# Patient Record
Sex: MALE | Race: WHITE | Employment: UNEMPLOYED | URBAN - METROPOLITAN AREA
[De-identification: names, ages, dates, MRNs, and addresses within clinical notes are randomized per-mention and may not be internally consistent; named-entity substitution may affect disease eponyms.]

---

## 2017-01-26 DIAGNOSIS — K21.9 GASTROESOPHAGEAL REFLUX DISEASE, ESOPHAGITIS PRESENCE NOT SPECIFIED: ICD-10-CM

## 2017-01-27 RX ORDER — RANITIDINE 150 MG/1
150 TABLET ORAL 2 TIMES DAILY
Qty: 60 TABLET | Refills: 3 | OUTPATIENT
Start: 2017-01-27

## 2017-01-27 RX ORDER — PANTOPRAZOLE SODIUM 40 MG/1
40 TABLET, DELAYED RELEASE ORAL DAILY
Qty: 30 TABLET | Refills: 3 | Status: SHIPPED | OUTPATIENT
Start: 2017-01-27 | End: 2018-06-11

## 2017-06-08 ENCOUNTER — OFFICE VISIT (OUTPATIENT)
Dept: INTERNAL MEDICINE CLINIC | Age: 22
End: 2017-06-08

## 2017-06-08 VITALS
HEART RATE: 117 BPM | DIASTOLIC BLOOD PRESSURE: 84 MMHG | WEIGHT: 210.6 LBS | SYSTOLIC BLOOD PRESSURE: 108 MMHG | BODY MASS INDEX: 27.79 KG/M2

## 2017-06-08 DIAGNOSIS — F84.9: ICD-10-CM

## 2017-06-08 DIAGNOSIS — R00.0 TACHYCARDIA: ICD-10-CM

## 2017-06-08 DIAGNOSIS — Z91.09 ENVIRONMENTAL ALLERGIES: ICD-10-CM

## 2017-06-08 DIAGNOSIS — Z00.00 ANNUAL PHYSICAL EXAM: ICD-10-CM

## 2017-06-08 DIAGNOSIS — F84.0 AUTISM SPECTRUM DISORDER: ICD-10-CM

## 2017-06-08 DIAGNOSIS — E78.2 MIXED HYPERLIPIDEMIA: ICD-10-CM

## 2017-06-08 DIAGNOSIS — K21.9 GASTROESOPHAGEAL REFLUX DISEASE, ESOPHAGITIS PRESENCE NOT SPECIFIED: ICD-10-CM

## 2017-06-08 DIAGNOSIS — F33.9 MAJOR DEPRESSIVE DISORDER, RECURRENT EPISODE WITH ANXIOUS DISTRESS (HCC): Primary | ICD-10-CM

## 2017-06-08 DIAGNOSIS — E66.3 OVERWEIGHT (BMI 25.0-29.9): ICD-10-CM

## 2017-06-08 PROCEDURE — G8427 DOCREV CUR MEDS BY ELIG CLIN: HCPCS | Performed by: INTERNAL MEDICINE

## 2017-06-08 PROCEDURE — 1036F TOBACCO NON-USER: CPT | Performed by: INTERNAL MEDICINE

## 2017-06-08 PROCEDURE — G8419 CALC BMI OUT NRM PARAM NOF/U: HCPCS | Performed by: INTERNAL MEDICINE

## 2017-06-08 PROCEDURE — 99213 OFFICE O/P EST LOW 20 MIN: CPT | Performed by: INTERNAL MEDICINE

## 2017-06-08 RX ORDER — CETIRIZINE HYDROCHLORIDE 10 MG/1
10 TABLET ORAL DAILY
Qty: 30 TABLET | Refills: 1 | Status: CANCELLED | OUTPATIENT
Start: 2017-06-08

## 2017-06-08 ASSESSMENT — ENCOUNTER SYMPTOMS
SHORTNESS OF BREATH: 0
EYE PAIN: 0
ABDOMINAL PAIN: 0
DIARRHEA: 0
COUGH: 0
CONSTIPATION: 0
BACK PAIN: 0
SORE THROAT: 0
WHEEZING: 0

## 2017-06-27 ENCOUNTER — HOSPITAL ENCOUNTER (OUTPATIENT)
Dept: GENERAL RADIOLOGY | Age: 22
Discharge: HOME | End: 2017-06-27
Attending: INTERNAL MEDICINE | Admitting: INTERNAL MEDICINE

## 2017-06-27 LAB
ALBUMIN SERPL-MCNC: 4.6 GM/DL (ref 3.4–5)
ALP BLD-CCNC: 103 IU/L (ref 40–128)
ALT SERPL-CCNC: 44 U/L (ref 10–40)
ANION GAP SERPL CALCULATED.3IONS-SCNC: 16 MMOL/L (ref 4–16)
AST SERPL-CCNC: 20 IU/L (ref 15–37)
BASOPHILS ABSOLUTE: 0 K/CU MM
BASOPHILS RELATIVE PERCENT: 0 % (ref 0–1)
BILIRUB SERPL-MCNC: 0.4 MG/DL (ref 0–1)
BUN BLDV-MCNC: 11 MG/DL (ref 6–23)
CALCIUM SERPL-MCNC: 9.8 MG/DL (ref 8.3–10.6)
CHLORIDE BLD-SCNC: 101 MMOL/L (ref 99–110)
CHOLESTEROL: 171 MG/DL
CO2: 26 MMOL/L (ref 21–32)
CREAT SERPL-MCNC: 0.7 MG/DL (ref 0.9–1.3)
DIFFERENTIAL TYPE: ABNORMAL
EOSINOPHILS ABSOLUTE: 0 K/CU MM
EOSINOPHILS RELATIVE PERCENT: 0 % (ref 0–3)
GFR AFRICAN AMERICAN: >60 ML/MIN/1.73M2
GFR NON-AFRICAN AMERICAN: >60 ML/MIN/1.73M2
GLUCOSE FASTING: 79 MG/DL (ref 70–99)
HCT VFR BLD CALC: 46.9 % (ref 42–52)
HDLC SERPL-MCNC: 42 MG/DL
HEMOGLOBIN: 15.8 GM/DL (ref 13.5–18)
IMMATURE NEUTROPHIL %: 0.3 % (ref 0–0.43)
LDL CHOLESTEROL CALCULATED: 115 MG/DL
LYMPHOCYTES ABSOLUTE: 1.6 K/CU MM
LYMPHOCYTES RELATIVE PERCENT: 27 % (ref 24–44)
MCH RBC QN AUTO: 29.4 PG (ref 27–31)
MCHC RBC AUTO-ENTMCNC: 33.7 % (ref 32–36)
MCV RBC AUTO: 87.2 FL (ref 78–100)
MONOCYTES ABSOLUTE: 0.3 K/CU MM
MONOCYTES RELATIVE PERCENT: 5.7 % (ref 0–4)
NUCLEATED RBC %: 0 %
PDW BLD-RTO: 13.1 % (ref 11.7–14.9)
PLATELET # BLD: 258 K/CU MM (ref 140–440)
PMV BLD AUTO: 10.2 FL (ref 7.5–11.1)
POTASSIUM SERPL-SCNC: 4.3 MMOL/L (ref 3.5–5.1)
RBC # BLD: 5.38 M/CU MM (ref 4.6–6.2)
SEGMENTED NEUTROPHILS ABSOLUTE COUNT: 4 K/CU MM
SEGMENTED NEUTROPHILS RELATIVE PERCENT: 67 % (ref 36–66)
SODIUM BLD-SCNC: 143 MMOL/L (ref 135–145)
TOTAL IMMATURE NEUTOROPHIL: 0.02 K/CU MM
TOTAL NUCLEATED RBC: 0 K/CU MM
TOTAL PROTEIN: 7.2 GM/DL (ref 6.4–8.2)
TRIGL SERPL-MCNC: 69 MG/DL
WBC # BLD: 6 K/CU MM (ref 4–10.5)

## 2017-12-07 ENCOUNTER — TELEPHONE (OUTPATIENT)
Dept: INTERNAL MEDICINE CLINIC | Age: 22
End: 2017-12-07

## 2017-12-08 ENCOUNTER — OFFICE VISIT (OUTPATIENT)
Dept: INTERNAL MEDICINE CLINIC | Age: 22
End: 2017-12-08

## 2017-12-08 VITALS
OXYGEN SATURATION: 97 % | RESPIRATION RATE: 16 BRPM | WEIGHT: 211 LBS | BODY MASS INDEX: 27.84 KG/M2 | HEART RATE: 89 BPM | SYSTOLIC BLOOD PRESSURE: 118 MMHG | DIASTOLIC BLOOD PRESSURE: 72 MMHG

## 2017-12-08 DIAGNOSIS — F84.0 AUTISM SPECTRUM DISORDER: ICD-10-CM

## 2017-12-08 DIAGNOSIS — F33.9 MAJOR DEPRESSIVE DISORDER, RECURRENT EPISODE WITH ANXIOUS DISTRESS (HCC): Primary | ICD-10-CM

## 2017-12-08 DIAGNOSIS — R74.01 TRANSAMINITIS: ICD-10-CM

## 2017-12-08 DIAGNOSIS — K21.9 GASTROESOPHAGEAL REFLUX DISEASE, ESOPHAGITIS PRESENCE NOT SPECIFIED: ICD-10-CM

## 2017-12-08 DIAGNOSIS — Z13.220 LIPID SCREENING: ICD-10-CM

## 2017-12-08 DIAGNOSIS — F84.5 ASPERGER'S DISORDER: ICD-10-CM

## 2017-12-08 DIAGNOSIS — Z23 NEED FOR INFLUENZA VACCINATION: ICD-10-CM

## 2017-12-08 DIAGNOSIS — E66.3 OVERWEIGHT (BMI 25.0-29.9): ICD-10-CM

## 2017-12-08 PROCEDURE — G8427 DOCREV CUR MEDS BY ELIG CLIN: HCPCS | Performed by: INTERNAL MEDICINE

## 2017-12-08 PROCEDURE — 99213 OFFICE O/P EST LOW 20 MIN: CPT | Performed by: INTERNAL MEDICINE

## 2017-12-08 PROCEDURE — G8484 FLU IMMUNIZE NO ADMIN: HCPCS | Performed by: INTERNAL MEDICINE

## 2017-12-08 PROCEDURE — 90686 IIV4 VACC NO PRSV 0.5 ML IM: CPT | Performed by: INTERNAL MEDICINE

## 2017-12-08 PROCEDURE — G8419 CALC BMI OUT NRM PARAM NOF/U: HCPCS | Performed by: INTERNAL MEDICINE

## 2017-12-08 PROCEDURE — 1036F TOBACCO NON-USER: CPT | Performed by: INTERNAL MEDICINE

## 2017-12-08 PROCEDURE — 90471 IMMUNIZATION ADMIN: CPT | Performed by: INTERNAL MEDICINE

## 2017-12-08 ASSESSMENT — ENCOUNTER SYMPTOMS
SHORTNESS OF BREATH: 0
CONSTIPATION: 0
WHEEZING: 0
COUGH: 0
BACK PAIN: 0
SORE THROAT: 0
DIARRHEA: 0
ABDOMINAL PAIN: 0
EYE PAIN: 0

## 2018-06-11 ENCOUNTER — OFFICE VISIT (OUTPATIENT)
Dept: INTERNAL MEDICINE CLINIC | Age: 23
End: 2018-06-11

## 2018-06-11 VITALS
HEIGHT: 72 IN | SYSTOLIC BLOOD PRESSURE: 118 MMHG | RESPIRATION RATE: 14 BRPM | HEART RATE: 112 BPM | BODY MASS INDEX: 28.17 KG/M2 | WEIGHT: 208 LBS | DIASTOLIC BLOOD PRESSURE: 60 MMHG | OXYGEN SATURATION: 98 %

## 2018-06-11 DIAGNOSIS — F33.9 MAJOR DEPRESSIVE DISORDER, RECURRENT EPISODE WITH ANXIOUS DISTRESS (HCC): Primary | ICD-10-CM

## 2018-06-11 DIAGNOSIS — E78.2 MIXED HYPERLIPIDEMIA: ICD-10-CM

## 2018-06-11 PROCEDURE — 1036F TOBACCO NON-USER: CPT | Performed by: FAMILY MEDICINE

## 2018-06-11 PROCEDURE — 99202 OFFICE O/P NEW SF 15 MIN: CPT | Performed by: FAMILY MEDICINE

## 2018-06-11 PROCEDURE — G8427 DOCREV CUR MEDS BY ELIG CLIN: HCPCS | Performed by: FAMILY MEDICINE

## 2018-06-11 PROCEDURE — G8419 CALC BMI OUT NRM PARAM NOF/U: HCPCS | Performed by: FAMILY MEDICINE

## 2018-06-11 RX ORDER — BUSPIRONE HYDROCHLORIDE 10 MG/1
TABLET ORAL
Status: CANCELLED | OUTPATIENT
Start: 2018-06-11

## 2018-06-11 RX ORDER — VENLAFAXINE HYDROCHLORIDE 150 MG/1
150 CAPSULE, EXTENDED RELEASE ORAL DAILY
Qty: 30 CAPSULE | Status: CANCELLED | OUTPATIENT
Start: 2018-06-11

## 2018-06-11 ASSESSMENT — ENCOUNTER SYMPTOMS
BLOOD IN STOOL: 0
EYE DISCHARGE: 0
SHORTNESS OF BREATH: 0
NAUSEA: 0
DIARRHEA: 0
SINUS PRESSURE: 0
COUGH: 0
VOMITING: 0
BACK PAIN: 0
SORE THROAT: 0

## 2018-06-16 LAB
CHOLESTEROL, TOTAL: 208 MG/DL
CHOLESTEROL/HDL RATIO: ABNORMAL
HDLC SERPL-MCNC: 45 MG/DL (ref 35–70)
LDL CHOLESTEROL CALCULATED: 146 MG/DL (ref 0–160)
TRIGL SERPL-MCNC: 85 MG/DL
VLDLC SERPL CALC-MCNC: 17 MG/DL

## 2018-06-26 ENCOUNTER — TELEPHONE (OUTPATIENT)
Dept: INTERNAL MEDICINE CLINIC | Age: 23
End: 2018-06-26

## 2018-06-26 DIAGNOSIS — E78.2 MIXED HYPERLIPIDEMIA: ICD-10-CM

## 2018-06-28 ENCOUNTER — OFFICE VISIT (OUTPATIENT)
Dept: PSYCHOLOGY | Age: 23
End: 2018-06-28

## 2018-06-28 DIAGNOSIS — F84.0 AUTISM SPECTRUM DISORDER: ICD-10-CM

## 2018-06-28 DIAGNOSIS — F32.A DEPRESSIVE DISORDER: Primary | ICD-10-CM

## 2018-06-28 PROCEDURE — 90791 PSYCH DIAGNOSTIC EVALUATION: CPT | Performed by: PSYCHOLOGIST

## 2018-06-28 ASSESSMENT — PATIENT HEALTH QUESTIONNAIRE - PHQ9
2. FEELING DOWN, DEPRESSED OR HOPELESS: 1
6. FEELING BAD ABOUT YOURSELF - OR THAT YOU ARE A FAILURE OR HAVE LET YOURSELF OR YOUR FAMILY DOWN: 1
9. THOUGHTS THAT YOU WOULD BE BETTER OFF DEAD, OR OF HURTING YOURSELF: 0
7. TROUBLE CONCENTRATING ON THINGS, SUCH AS READING THE NEWSPAPER OR WATCHING TELEVISION: 0
10. IF YOU CHECKED OFF ANY PROBLEMS, HOW DIFFICULT HAVE THESE PROBLEMS MADE IT FOR YOU TO DO YOUR WORK, TAKE CARE OF THINGS AT HOME, OR GET ALONG WITH OTHER PEOPLE: 1
3. TROUBLE FALLING OR STAYING ASLEEP: 2
1. LITTLE INTEREST OR PLEASURE IN DOING THINGS: 1
5. POOR APPETITE OR OVEREATING: 0
SUM OF ALL RESPONSES TO PHQ QUESTIONS 1-9: 6
8. MOVING OR SPEAKING SO SLOWLY THAT OTHER PEOPLE COULD HAVE NOTICED. OR THE OPPOSITE, BEING SO FIGETY OR RESTLESS THAT YOU HAVE BEEN MOVING AROUND A LOT MORE THAN USUAL: 0
4. FEELING TIRED OR HAVING LITTLE ENERGY: 1
SUM OF ALL RESPONSES TO PHQ9 QUESTIONS 1 & 2: 2

## 2018-06-29 ENCOUNTER — TELEPHONE (OUTPATIENT)
Dept: INTERNAL MEDICINE CLINIC | Age: 23
End: 2018-06-29

## 2018-07-03 ENCOUNTER — TELEPHONE (OUTPATIENT)
Dept: INTERNAL MEDICINE CLINIC | Age: 23
End: 2018-07-03

## 2018-07-12 ENCOUNTER — OFFICE VISIT (OUTPATIENT)
Dept: PSYCHOLOGY | Age: 23
End: 2018-07-12

## 2018-07-12 DIAGNOSIS — F84.0 AUTISM SPECTRUM DISORDER: ICD-10-CM

## 2018-07-12 DIAGNOSIS — F32.A DEPRESSIVE DISORDER: Primary | ICD-10-CM

## 2018-07-12 PROCEDURE — 90832 PSYTX W PT 30 MINUTES: CPT | Performed by: PSYCHOLOGIST

## 2018-07-12 ASSESSMENT — PATIENT HEALTH QUESTIONNAIRE - PHQ9
4. FEELING TIRED OR HAVING LITTLE ENERGY: 1
8. MOVING OR SPEAKING SO SLOWLY THAT OTHER PEOPLE COULD HAVE NOTICED. OR THE OPPOSITE, BEING SO FIGETY OR RESTLESS THAT YOU HAVE BEEN MOVING AROUND A LOT MORE THAN USUAL: 0
6. FEELING BAD ABOUT YOURSELF - OR THAT YOU ARE A FAILURE OR HAVE LET YOURSELF OR YOUR FAMILY DOWN: 0
9. THOUGHTS THAT YOU WOULD BE BETTER OFF DEAD, OR OF HURTING YOURSELF: 0
3. TROUBLE FALLING OR STAYING ASLEEP: 2
1. LITTLE INTEREST OR PLEASURE IN DOING THINGS: 1
SUM OF ALL RESPONSES TO PHQ9 QUESTIONS 1 & 2: 2
7. TROUBLE CONCENTRATING ON THINGS, SUCH AS READING THE NEWSPAPER OR WATCHING TELEVISION: 0
SUM OF ALL RESPONSES TO PHQ QUESTIONS 1-9: 5
2. FEELING DOWN, DEPRESSED OR HOPELESS: 1
5. POOR APPETITE OR OVEREATING: 0

## 2018-07-12 NOTE — PROGRESS NOTES
Behavioral Health Consultation  Laine Gaspar Psy.D. Psychologist      Time spent with Patient: 30 minutes  Visit number: 2  Reason for Consult:  depression and autism  Referring Provider: Mikey Dooley MD  [address], [place] [address]    S:  ----------------------------------------------------------------------------------------------------------------------  \"Things have been okay I guess. \" Has been enjoying playing video games and talking with friends online. Spent much of developing rapport by discussing pt's interests in video games, writing, history. Pt is not connected with [facility]  office, but is open to referral.     O:  ----------------------------------------------------------------------------------------------------------------------  MSE:  Orientation:  oriented to person, place, time, and general circumstances  Appearance and behavior:  alert, cooperative  Speech:  spontaneous, normal rate and normal volume  Mood: depressed   Thought Content:  intact, hopelessness and helplessness  Thought Process:  linear, goal directed and coherent  Interest/Pleasure: Loss of Pleasure/Fun  Sleep disturbance: Yes  Motivation: Moderate  Energy: Tired/Fatigued  Morbid ideation No  Suicide Assessment: no suicidal ideation    A:  ----------------------------------------------------------------------------------------------------------------------  Diagnosis:    1. Depressive disorder    2.  Autism spectrum disorder         PHQ Scores 7/12/2018 6/28/2018 6/8/2016   PHQ2 Score 2 2 0   PHQ9 Score 5 6 0     Interpretation of Total Score Depression Severity: 1-4 = Minimal depression, 5-9 = Mild depression, 10-14 = Moderate depression, 15-19 = Moderately severe depression, 20-27 = Severe depression    P:  ----------------------------------------------------------------------------------------------------------------------  Pt will need full neurodevelopmental assessment in order to be linked with local dept of DD. Pt to speak w guardian and attempt to obtain records. If unable will be referred for testing. Pt interventions:    General:   [x] [place]-setting to identify pt's primary goals for [facility] visit / overall health   [x] Provided psychoeducation/handout on:   1. Depressive disorder    2. Autism spectrum disorder        [x]  Supportive interventions    Cognitive:   [x] Trained in strategies for increasing balanced thinking   [x] Cognitive strategies to target current mental health sx    [x] Identified and challenged maladaptive thoughts    Behavioral:   [x] Discussed and set plan for behavioral activation   [x] Discussed and problem-solved barriers in adhering to behavioral change plan   [x] Motivational Interviewing to increase patient confidence and compliance with       adhering to behavioral change plan   [x] Discussed potential barriers to change   [x] Discussed self-care (sleep, nutrition, rewarding activities, social support, exercise)    Other:   []   []   []   []    Recommendations to patient:    1. Return to Dr. Que Andrews in 2-4 week(s)    2.                Feedback provided to pt's PCP via [employer] and/or oral report

## 2018-08-02 ENCOUNTER — OFFICE VISIT (OUTPATIENT)
Dept: PSYCHOLOGY | Age: 23
End: 2018-08-02

## 2018-08-02 DIAGNOSIS — F32.A DEPRESSIVE DISORDER: Primary | ICD-10-CM

## 2018-08-02 DIAGNOSIS — F84.0 AUTISM SPECTRUM DISORDER: ICD-10-CM

## 2018-08-02 PROCEDURE — 90832 PSYTX W PT 30 MINUTES: CPT | Performed by: PSYCHOLOGIST

## 2018-08-02 ASSESSMENT — PATIENT HEALTH QUESTIONNAIRE - PHQ9
8. MOVING OR SPEAKING SO SLOWLY THAT OTHER PEOPLE COULD HAVE NOTICED. OR THE OPPOSITE, BEING SO FIGETY OR RESTLESS THAT YOU HAVE BEEN MOVING AROUND A LOT MORE THAN USUAL: 0
SUM OF ALL RESPONSES TO PHQ QUESTIONS 1-9: 6
9. THOUGHTS THAT YOU WOULD BE BETTER OFF DEAD, OR OF HURTING YOURSELF: 0
SUM OF ALL RESPONSES TO PHQ9 QUESTIONS 1 & 2: 2
6. FEELING BAD ABOUT YOURSELF - OR THAT YOU ARE A FAILURE OR HAVE LET YOURSELF OR YOUR FAMILY DOWN: 0
4. FEELING TIRED OR HAVING LITTLE ENERGY: 1
5. POOR APPETITE OR OVEREATING: 0
2. FEELING DOWN, DEPRESSED OR HOPELESS: 1
7. TROUBLE CONCENTRATING ON THINGS, SUCH AS READING THE NEWSPAPER OR WATCHING TELEVISION: 1
1. LITTLE INTEREST OR PLEASURE IN DOING THINGS: 1
3. TROUBLE FALLING OR STAYING ASLEEP: 2

## 2018-08-02 NOTE — PROGRESS NOTES
Behavioral Health Consultation  Laine Felder Psy.D. Psychologist    Time spent with Patient: 30 minutes  Visit number: 3  Reason for Consult:  depression and autism  Referring Provider: Suzi Gonzalez MD  [address] [place] [address]    S:  ----------------------------------------------------------------------------------------------------------------------  Mood remains depressed. Did not speak with guardian about full psych reports/testing. Remains interested in being linked with local [facility] of . [place] that his step-father's boss has a daughter who is also autistic. Pasqual Essex is excited about the opportunity to meet someone else who is autistic. Received verbal authorization to send referral for testing to [address]:  ----------------------------------------------------------------------------------------------------------------------  MSE:  Orientation:  oriented to person, place, time, and general circumstances  Appearance and behavior:  alert, cooperative  Speech:  spontaneous, normal rate and normal volume  Mood: depressed   Thought Content:  intact, hopelessness and helplessness  Thought Process:  linear, goal directed and coherent  Interest/Pleasure: Loss of Pleasure/Fun  Sleep disturbance: Yes  Motivation: Moderate  Energy: Tired/Fatigued  Morbid ideation No  Suicide Assessment: no suicidal ideation    A:  ----------------------------------------------------------------------------------------------------------------------  Diagnosis:    1. Depressive disorder    2.  Autism spectrum disorder         PHQ Scores 8/2/2018 7/12/2018 6/28/2018 6/8/2016   PHQ2 Score 2 2 2 0   PHQ9 Score 6 5 6 0     Interpretation of Total Score Depression Severity: 1-4 = Minimal depression, 5-9 = Mild depression, 10-14 = Moderate depression, 15-19 = Moderately severe depression, 20-27 = Severe depression    P:  ----------------------------------------------------------------------------------------------------------------------    [facility]. Send referral, [phone]    General:   [x] [place]-setting to identify pt's primary goals for [facility] visit / overall health   [x] Provided psychoeducation/handout on:   1. Depressive disorder    2.  Autism spectrum disorder        [x]  Supportive interventions    Cognitive:   [x] Trained in strategies for increasing balanced thinking   [x] Cognitive strategies to target current mental health sx    [x] Identified and challenged maladaptive thoughts    Behavioral:   [x] Discussed and set plan for behavioral activation   [x] Discussed and problem-solved barriers in adhering to behavioral change plan   [x] Motivational Interviewing to increase patient confidence and compliance with       adhering to behavioral change plan   [x] Discussed potential barriers to change   [x] Discussed self-care (sleep, nutrition, rewarding activities, social support, exercise)    Other:   []   []   []   []                    Feedback provided to pt's PCP via [employer] and/or oral report

## 2018-08-02 NOTE — PATIENT INSTRUCTIONS
[facility]:  [address]:  [address]:  [phone]    Marisa Mahmood, PhD:  [phone]

## 2018-09-12 ENCOUNTER — OFFICE VISIT (OUTPATIENT)
Dept: PSYCHOLOGY | Age: 23
End: 2018-09-12

## 2018-09-12 DIAGNOSIS — F84.0 AUTISM SPECTRUM DISORDER: ICD-10-CM

## 2018-09-12 DIAGNOSIS — F32.A DEPRESSIVE DISORDER: Primary | ICD-10-CM

## 2018-09-12 PROCEDURE — 90832 PSYTX W PT 30 MINUTES: CPT | Performed by: PSYCHOLOGIST

## 2018-09-12 ASSESSMENT — PATIENT HEALTH QUESTIONNAIRE - PHQ9
SUM OF ALL RESPONSES TO PHQ QUESTIONS 1-9: 4
5. POOR APPETITE OR OVEREATING: 0
2. FEELING DOWN, DEPRESSED OR HOPELESS: 0
8. MOVING OR SPEAKING SO SLOWLY THAT OTHER PEOPLE COULD HAVE NOTICED. OR THE OPPOSITE, BEING SO FIGETY OR RESTLESS THAT YOU HAVE BEEN MOVING AROUND A LOT MORE THAN USUAL: 0
SUM OF ALL RESPONSES TO PHQ QUESTIONS 1-9: 4
4. FEELING TIRED OR HAVING LITTLE ENERGY: 1
7. TROUBLE CONCENTRATING ON THINGS, SUCH AS READING THE NEWSPAPER OR WATCHING TELEVISION: 0
6. FEELING BAD ABOUT YOURSELF - OR THAT YOU ARE A FAILURE OR HAVE LET YOURSELF OR YOUR FAMILY DOWN: 0
1. LITTLE INTEREST OR PLEASURE IN DOING THINGS: 1
9. THOUGHTS THAT YOU WOULD BE BETTER OFF DEAD, OR OF HURTING YOURSELF: 0
3. TROUBLE FALLING OR STAYING ASLEEP: 2
10. IF YOU CHECKED OFF ANY PROBLEMS, HOW DIFFICULT HAVE THESE PROBLEMS MADE IT FOR YOU TO DO YOUR WORK, TAKE CARE OF THINGS AT HOME, OR GET ALONG WITH OTHER PEOPLE: 1
SUM OF ALL RESPONSES TO PHQ9 QUESTIONS 1 & 2: 1

## 2018-09-12 NOTE — PROGRESS NOTES
depression    P:  ----------------------------------------------------------------------------------------------------------------------  Pt interventions:    General:   [x] [place]-setting to identify pt's primary goals for [facility] visit / overall health   [x] Provided psychoeducation/handout on:   1. Depressive disorder    2. Autism spectrum disorder        [x]  Supportive interventions    Cognitive:   [x] Trained in strategies for increasing balanced thinking   [x] Cognitive strategies to target current mental health sx    [x] Identified and challenged maladaptive thoughts    Behavioral:   [x] Discussed and set plan for behavioral activation   [x] Discussed and problem-solved barriers in adhering to behavioral change plan   [x] Motivational Interviewing to increase patient confidence and compliance with       adhering to behavioral change plan   [x] Discussed potential barriers to change   [x] Discussed self-care (sleep, nutrition, rewarding activities, social support, exercise)    Other:   []   []   []   []    Recommendations to patient:    1. Return to Dr. Nilsa Cox in 4 week(s)    2.                Feedback provided to pt's PCP via [employer] and/or oral report

## 2018-10-10 ENCOUNTER — OFFICE VISIT (OUTPATIENT)
Dept: PSYCHOLOGY | Age: 23
End: 2018-10-10
Payer: MEDICARE

## 2018-10-10 DIAGNOSIS — F32.A DEPRESSIVE DISORDER: Primary | ICD-10-CM

## 2018-10-10 DIAGNOSIS — F84.0 AUTISM SPECTRUM DISORDER: ICD-10-CM

## 2018-10-10 PROCEDURE — 90832 PSYTX W PT 30 MINUTES: CPT | Performed by: PSYCHOLOGIST

## 2018-10-10 ASSESSMENT — PATIENT HEALTH QUESTIONNAIRE - PHQ9
6. FEELING BAD ABOUT YOURSELF - OR THAT YOU ARE A FAILURE OR HAVE LET YOURSELF OR YOUR FAMILY DOWN: 1
SUM OF ALL RESPONSES TO PHQ9 QUESTIONS 1 & 2: 2
SUM OF ALL RESPONSES TO PHQ QUESTIONS 1-9: 6
8. MOVING OR SPEAKING SO SLOWLY THAT OTHER PEOPLE COULD HAVE NOTICED. OR THE OPPOSITE, BEING SO FIGETY OR RESTLESS THAT YOU HAVE BEEN MOVING AROUND A LOT MORE THAN USUAL: 0
7. TROUBLE CONCENTRATING ON THINGS, SUCH AS READING THE NEWSPAPER OR WATCHING TELEVISION: 1
SUM OF ALL RESPONSES TO PHQ QUESTIONS 1-9: 6
2. FEELING DOWN, DEPRESSED OR HOPELESS: 1
4. FEELING TIRED OR HAVING LITTLE ENERGY: 0
1. LITTLE INTEREST OR PLEASURE IN DOING THINGS: 1
5. POOR APPETITE OR OVEREATING: 0
9. THOUGHTS THAT YOU WOULD BE BETTER OFF DEAD, OR OF HURTING YOURSELF: 0
3. TROUBLE FALLING OR STAYING ASLEEP: 2

## 2018-10-10 NOTE — PROGRESS NOTES
Behavioral Health Consultation  Laine Valentino Psy.D. Psychologist      Time spent with Patient: 30 minutes  Visit number: 5  Reason for Consult:  depression and autism  Referring Provider: Ulises Rich MD  [address] [place] [address] [address]    S:  ----------------------------------------------------------------------------------------------------------------------  Depression and ASD  \"it's been pretty uneventful. Nothing too bad and nothing too great. \" mood has been \"pretty stable,\" however some depressed mood and mild anhedonia. During visit call was placed to Dr. Yamileth Mckeon office to determine pt's location on waiting list. Informed that pt should be contacted by January 2019. Give proximity to pt's birthday will attempt to secure testing elsewhere. Pt needs formal assessment and report by 24[birth year] bday to be eligible for [facility] services. O:  ----------------------------------------------------------------------------------------------------------------------  MSE:  Orientation:  oriented to person, place, time, and general circumstances  Appearance and behavior:  alert, cooperative  Speech:  spontaneous, normal rate and normal volume  Mood: depressed   Thought Content:  intact, hopelessness and helplessness  Thought Process:  linear, goal directed and coherent  Interest/Pleasure: Loss of Pleasure/Fun  Sleep disturbance: Yes  Motivation: Moderate  Energy: Tired/Fatigued  Morbid ideation No  Suicide Assessment: no suicidal ideation    A:  ----------------------------------------------------------------------------------------------------------------------  Diagnosis:    1. Depressive disorder    2.  Autism spectrum disorder         PHQ Scores 10/10/2018 9/12/2018 8/2/2018 7/12/2018 6/28/2018 6/8/2016   PHQ2 Score 2 1 2 2 2 0   PHQ9 Score 6 4 6 5 6 0     Interpretation of Total Score Depression Severity: 1-4 = Minimal depression, 5-9 = Mild depression, 10-14 = Moderate depression, 15-19 =

## 2018-11-07 ENCOUNTER — OFFICE VISIT (OUTPATIENT)
Dept: PSYCHOLOGY | Age: 23
End: 2018-11-07
Payer: MEDICARE

## 2018-11-07 DIAGNOSIS — F32.A DEPRESSIVE DISORDER: Primary | ICD-10-CM

## 2018-11-07 DIAGNOSIS — F84.0 AUTISM SPECTRUM DISORDER: ICD-10-CM

## 2018-11-07 PROCEDURE — 90832 PSYTX W PT 30 MINUTES: CPT | Performed by: PSYCHOLOGIST

## 2018-11-07 ASSESSMENT — PATIENT HEALTH QUESTIONNAIRE - PHQ9
8. MOVING OR SPEAKING SO SLOWLY THAT OTHER PEOPLE COULD HAVE NOTICED. OR THE OPPOSITE, BEING SO FIGETY OR RESTLESS THAT YOU HAVE BEEN MOVING AROUND A LOT MORE THAN USUAL: 0
9. THOUGHTS THAT YOU WOULD BE BETTER OFF DEAD, OR OF HURTING YOURSELF: 0
3. TROUBLE FALLING OR STAYING ASLEEP: 1
6. FEELING BAD ABOUT YOURSELF - OR THAT YOU ARE A FAILURE OR HAVE LET YOURSELF OR YOUR FAMILY DOWN: 1
10. IF YOU CHECKED OFF ANY PROBLEMS, HOW DIFFICULT HAVE THESE PROBLEMS MADE IT FOR YOU TO DO YOUR WORK, TAKE CARE OF THINGS AT HOME, OR GET ALONG WITH OTHER PEOPLE: 1
7. TROUBLE CONCENTRATING ON THINGS, SUCH AS READING THE NEWSPAPER OR WATCHING TELEVISION: 0
5. POOR APPETITE OR OVEREATING: 0
SUM OF ALL RESPONSES TO PHQ QUESTIONS 1-9: 4
1. LITTLE INTEREST OR PLEASURE IN DOING THINGS: 0
2. FEELING DOWN, DEPRESSED OR HOPELESS: 1
SUM OF ALL RESPONSES TO PHQ9 QUESTIONS 1 & 2: 1
4. FEELING TIRED OR HAVING LITTLE ENERGY: 1
SUM OF ALL RESPONSES TO PHQ QUESTIONS 1-9: 4

## 2018-11-07 NOTE — PROGRESS NOTES
Behavioral Health Consultation  Laine Estrada Psy.D. Psychologist      Time spent with Patient: 30 minutes  Visit number: 6  Reason for Consult:  depression and autism  Referring Provider: Billie Wu MD  [address], [place] [address]    S:  ----------------------------------------------------------------------------------------------------------------------   depression and autism  \"Not a whole lot going on. \" Stated his mood has been \"stable\" and anxiety manageable. Stated thoughts have not been racing. Still willing to complete developmental testing. Met with psychiatry last week. Pt ok'd referral to [facility] for testing. Called in referral with pt was in office. Pt will be contacted by [facility] provider for scheduling. O:  ----------------------------------------------------------------------------------------------------------------------  MSE:  Orientation:  oriented to person, place, time, and general circumstances  Appearance and behavior:  alert, cooperative  Speech:  spontaneous, normal rate and normal volume  Mood: depressed   Thought Content:  intact, hopelessness and helplessness  Thought Process:  linear, goal directed and coherent  Interest/Pleasure: Loss of Pleasure/Fun  Sleep disturbance: Yes  Motivation: Moderate  Energy: Tired/Fatigued  Morbid ideation No  Suicide Assessment: no suicidal ideation  A:  ----------------------------------------------------------------------------------------------------------------------  Diagnosis:    1. Depressive disorder    2.  Autism spectrum disorder         PHQ Scores 11/7/2018 10/10/2018 9/12/2018 8/2/2018 7/12/2018 6/28/2018 6/8/2016   PHQ2 Score 1 2 1 2 2 2 0   PHQ9 Score 4 6 4 6 5 6 0     Interpretation of Total Score Depression Severity: 1-4 = Minimal depression, 5-9 = Mild depression, 10-14 = Moderate depression, 15-19 = Moderately severe depression, 20-27 = Severe

## 2018-12-05 ENCOUNTER — OFFICE VISIT (OUTPATIENT)
Dept: PSYCHOLOGY | Age: 23
End: 2018-12-05
Payer: MEDICARE

## 2018-12-05 DIAGNOSIS — F84.0 AUTISM SPECTRUM DISORDER: ICD-10-CM

## 2018-12-05 DIAGNOSIS — F32.A DEPRESSIVE DISORDER: Primary | ICD-10-CM

## 2018-12-05 PROCEDURE — 90832 PSYTX W PT 30 MINUTES: CPT | Performed by: PSYCHOLOGIST

## 2018-12-05 NOTE — PROGRESS NOTES
health   [x] Provided psychoeducation/handout on:   1. Depressive disorder    2. Autism spectrum disorder        [x]  Supportive interventions    Behavioral:   [x] Discussed and set plan for behavioral activation   [x] Discussed and problem-solved barriers in adhering to behavioral change plan   [x] Motivational Interviewing to increase patient confidence and compliance with       adhering to behavioral change plan   [x] Discussed potential barriers to change   [x] Discussed self-care (sleep, nutrition, rewarding activities, social support, exercise)    Other:   []   []   []   []    Recommendations to patient:    1. Return to Dr. Sandra Grant in 4 week(s)    2.                Feedback provided to pt's PCP via [employer] and/or oral report

## 2019-01-02 ENCOUNTER — OFFICE VISIT (OUTPATIENT)
Dept: PSYCHOLOGY | Age: 24
End: 2019-01-02
Payer: MEDICARE

## 2019-01-02 ENCOUNTER — HOSPITAL ENCOUNTER (OUTPATIENT)
Age: 24
Discharge: HOME | End: 2019-01-02
Payer: MEDICARE

## 2019-01-02 DIAGNOSIS — F84.0 AUTISM SPECTRUM DISORDER: ICD-10-CM

## 2019-01-02 DIAGNOSIS — F32.A DEPRESSIVE DISORDER: Primary | ICD-10-CM

## 2019-01-02 LAB
ALBUMIN SERPL-MCNC: 4.8 GM/DL (ref 3.4–5)
ALP BLD-CCNC: 86 IU/L (ref 40–128)
ALT SERPL-CCNC: 55 U/L (ref 10–40)
ANION GAP SERPL CALCULATED.3IONS-SCNC: 16 MMOL/L (ref 4–16)
AST SERPL-CCNC: 29 IU/L (ref 15–37)
BASOPHILS ABSOLUTE: 0 K/CU MM
BASOPHILS RELATIVE PERCENT: 0.1 % (ref 0–1)
BILIRUB SERPL-MCNC: 0.4 MG/DL (ref 0–1)
BUN BLDV-MCNC: 10 MG/DL (ref 6–23)
CALCIUM SERPL-MCNC: 9.4 MG/DL (ref 8.3–10.6)
CHLORIDE BLD-SCNC: 101 MMOL/L (ref 99–110)
CHOLESTEROL: 211 MG/DL
CO2: 25 MMOL/L (ref 21–32)
CREAT SERPL-MCNC: 0.8 MG/DL (ref 0.9–1.3)
DIFFERENTIAL TYPE: ABNORMAL
EOSINOPHILS ABSOLUTE: 0 K/CU MM
EOSINOPHILS RELATIVE PERCENT: 0 % (ref 0–3)
GFR AFRICAN AMERICAN: >60 ML/MIN/1.73M2
GFR NON-AFRICAN AMERICAN: >60 ML/MIN/1.73M2
GLUCOSE BLD-MCNC: 93 MG/DL (ref 70–99)
HCT VFR BLD CALC: 50.4 % (ref 42–52)
HDLC SERPL-MCNC: 48 MG/DL
HEMOGLOBIN: 16.3 GM/DL (ref 13.5–18)
IMMATURE NEUTROPHIL %: 0.4 % (ref 0–0.43)
LDL CHOLESTEROL DIRECT: 162 MG/DL
LYMPHOCYTES ABSOLUTE: 1.6 K/CU MM
LYMPHOCYTES RELATIVE PERCENT: 21.9 % (ref 24–44)
MCH RBC QN AUTO: 28.8 PG (ref 27–31)
MCHC RBC AUTO-ENTMCNC: 32.3 % (ref 32–36)
MCV RBC AUTO: 89 FL (ref 78–100)
MONOCYTES ABSOLUTE: 0.5 K/CU MM
MONOCYTES RELATIVE PERCENT: 6.4 % (ref 0–4)
NUCLEATED RBC %: 0 %
PDW BLD-RTO: 13.2 % (ref 11.7–14.9)
PLATELET # BLD: 293 K/CU MM (ref 140–440)
PMV BLD AUTO: 9.5 FL (ref 7.5–11.1)
POTASSIUM SERPL-SCNC: 4.5 MMOL/L (ref 3.5–5.1)
RBC # BLD: 5.66 M/CU MM (ref 4.6–6.2)
SEGMENTED NEUTROPHILS ABSOLUTE COUNT: 5 K/CU MM
SEGMENTED NEUTROPHILS RELATIVE PERCENT: 71.2 % (ref 36–66)
SODIUM BLD-SCNC: 142 MMOL/L (ref 135–145)
TOTAL IMMATURE NEUTOROPHIL: 0.03 K/CU MM
TOTAL NUCLEATED RBC: 0 K/CU MM
TOTAL PROTEIN: 7.5 GM/DL (ref 6.4–8.2)
TRIGL SERPL-MCNC: 109 MG/DL
TSH HIGH SENSITIVITY: 0.75 UIU/ML (ref 0.27–4.2)
WBC # BLD: 7.1 K/CU MM (ref 4–10.5)

## 2019-01-02 PROCEDURE — 80053 COMPREHEN METABOLIC PANEL: CPT

## 2019-01-02 PROCEDURE — 83721 ASSAY OF BLOOD LIPOPROTEIN: CPT

## 2019-01-02 PROCEDURE — 80061 LIPID PANEL: CPT

## 2019-01-02 PROCEDURE — 90832 PSYTX W PT 30 MINUTES: CPT | Performed by: PSYCHOLOGIST

## 2019-01-02 PROCEDURE — 82306 VITAMIN D 25 HYDROXY: CPT

## 2019-01-02 PROCEDURE — 85025 COMPLETE CBC W/AUTO DIFF WBC: CPT

## 2019-01-02 PROCEDURE — 84443 ASSAY THYROID STIM HORMONE: CPT

## 2019-01-02 PROCEDURE — 36415 COLL VENOUS BLD VENIPUNCTURE: CPT

## 2019-01-03 LAB
VITAMIN D 1,25-DIHYDROXY: 55.2
VITAMIN D 25-HYDROXY: 22.1 NG/ML

## 2020-06-15 ENCOUNTER — OFFICE VISIT (OUTPATIENT)
Dept: SURGERY | Age: 25
End: 2020-06-15
Payer: MEDICARE

## 2020-06-15 VITALS
HEIGHT: 72 IN | HEART RATE: 93 BPM | BODY MASS INDEX: 27.3 KG/M2 | RESPIRATION RATE: 16 BRPM | WEIGHT: 201.6 LBS | OXYGEN SATURATION: 97 % | DIASTOLIC BLOOD PRESSURE: 80 MMHG | SYSTOLIC BLOOD PRESSURE: 122 MMHG | TEMPERATURE: 97.6 F

## 2020-06-15 PROCEDURE — 1036F TOBACCO NON-USER: CPT | Performed by: SURGERY

## 2020-06-15 PROCEDURE — G8419 CALC BMI OUT NRM PARAM NOF/U: HCPCS | Performed by: SURGERY

## 2020-06-15 PROCEDURE — G8427 DOCREV CUR MEDS BY ELIG CLIN: HCPCS | Performed by: SURGERY

## 2020-06-15 PROCEDURE — 99203 OFFICE O/P NEW LOW 30 MIN: CPT | Performed by: SURGERY

## 2020-06-15 RX ORDER — HYDROCORTISONE ACETATE 25 MG/1
25 SUPPOSITORY RECTAL EVERY 12 HOURS
Qty: 14 SUPPOSITORY | Refills: 0 | Status: SHIPPED | OUTPATIENT
Start: 2020-06-15

## 2020-06-15 ASSESSMENT — ENCOUNTER SYMPTOMS
EYE ITCHING: 0
CHOKING: 0
EYE REDNESS: 0
COLOR CHANGE: 0
STRIDOR: 0
RECTAL PAIN: 0
BACK PAIN: 0
SORE THROAT: 0
APNEA: 0
ANAL BLEEDING: 1
PHOTOPHOBIA: 0
CONSTIPATION: 0

## 2020-06-15 NOTE — PROGRESS NOTES
Chief Complaint   Patient presents with    Surgical Consult     Ruptured hemorrhoid that comes and goes       SUBJECTIVE:  HPI: Patient presentsfor evaluation of rectal pain. Onset of symptoms was 2 weeks ago. Patient does report perianal mass. Patient describes symptoms as gradually improving. Patient no fever/chills. Patient has bldy drainage from the rectal area. Treatment to date has been none. Patient denies a personalhistory of colon cancer. Patient denies family hx of colorectal CA  Patient denies a personal history of IBD. I have reviewedthe patient's(pertinent information to this visit) medical history, family history(scanned in  the Media tab under \"patient questioner\"), social history and review of systems with the patient today in the office.        Past Surgical History:   Procedure Laterality Date    APPENDECTOMY      APPENDECTOMY  15    COLONOSCOPY  15    normal    TYMPANOSTOMY TUBE PLACEMENT      as an infant     Past Medical History:   Diagnosis Date    Anxiety     Asperger's disorder     Autistic disorder     Depression     Development disorder, child     Pervasive    OCD (obsessive compulsive disorder)     Urosepsis          Family History   Problem Relation Age of Onset    Cancer Mother 25        cervical     High Cholesterol Mother     Kidney Disease Mother         stones    Diabetes Mother     High Blood Pressure Father     ADHD Brother     High Blood Pressure Maternal Grandmother     Heart Disease Maternal Grandmother     Diabetes Maternal Grandmother     High Blood Pressure Maternal Grandfather     High Blood Pressure Paternal Grandmother     Diabetes Paternal Grandmother     High Blood Pressure Paternal Grandfather      Social History     Socioeconomic History    Marital status: Single     Spouse name: Not on file    Number of children: Not on file    Years of education: Not on file    Highest education level: Not on file   Occupational

## 2020-10-07 ENCOUNTER — HOSPITAL ENCOUNTER (EMERGENCY)
Age: 25
Discharge: HOME | End: 2020-10-07
Attending: EMERGENCY MEDICINE
Payer: MEDICARE

## 2020-10-07 VITALS
OXYGEN SATURATION: 99 % | DIASTOLIC BLOOD PRESSURE: 66 MMHG | HEART RATE: 85 BPM | TEMPERATURE: 98.2 F | SYSTOLIC BLOOD PRESSURE: 132 MMHG | RESPIRATION RATE: 16 BRPM

## 2020-10-07 LAB
ACETAMINOPHEN LEVEL: <5 UG/ML (ref 15–30)
ALBUMIN SERPL-MCNC: 5.1 GM/DL (ref 3.4–5)
ALCOHOL SCREEN SERUM: <0.01 %WT/VOL
ALP BLD-CCNC: 101 IU/L (ref 40–129)
ALT SERPL-CCNC: 20 U/L (ref 10–40)
AMPHETAMINES: NEGATIVE
ANION GAP SERPL CALCULATED.3IONS-SCNC: 10 MMOL/L (ref 4–16)
AST SERPL-CCNC: 20 IU/L (ref 15–37)
BACTERIA: NEGATIVE /HPF
BARBITURATE SCREEN URINE: NEGATIVE
BASOPHILS ABSOLUTE: 0 K/CU MM
BASOPHILS RELATIVE PERCENT: 0 % (ref 0–1)
BENZODIAZEPINE SCREEN, URINE: NEGATIVE
BILIRUB SERPL-MCNC: 0.3 MG/DL (ref 0–1)
BILIRUBIN URINE: NEGATIVE MG/DL
BLOOD, URINE: NEGATIVE
BUN BLDV-MCNC: 13 MG/DL (ref 6–23)
CALCIUM SERPL-MCNC: 10 MG/DL (ref 8.3–10.6)
CANNABINOID SCREEN URINE: NEGATIVE
CHLORIDE BLD-SCNC: 100 MMOL/L (ref 99–110)
CLARITY: CLEAR
CO2: 31 MMOL/L (ref 21–32)
COCAINE METABOLITE: NEGATIVE
COLOR: YELLOW
CREAT SERPL-MCNC: 0.8 MG/DL (ref 0.9–1.3)
DIFFERENTIAL TYPE: ABNORMAL
DOSE AMOUNT: ABNORMAL
DOSE AMOUNT: ABNORMAL
DOSE TIME: ABNORMAL
DOSE TIME: ABNORMAL
EOSINOPHILS ABSOLUTE: 0 K/CU MM
EOSINOPHILS RELATIVE PERCENT: 0 % (ref 0–3)
GFR AFRICAN AMERICAN: >60 ML/MIN/1.73M2
GFR NON-AFRICAN AMERICAN: >60 ML/MIN/1.73M2
GLUCOSE BLD-MCNC: 87 MG/DL (ref 70–99)
GLUCOSE, URINE: NEGATIVE MG/DL
HCT VFR BLD CALC: 50.3 % (ref 42–52)
HEMOGLOBIN: 16 GM/DL (ref 13.5–18)
HYALINE CASTS: 2 /LPF
IMMATURE NEUTROPHIL %: 0.2 % (ref 0–0.43)
KETONES, URINE: NEGATIVE MG/DL
LEUKOCYTE ESTERASE, URINE: NEGATIVE
LYMPHOCYTES ABSOLUTE: 1.7 K/CU MM
LYMPHOCYTES RELATIVE PERCENT: 27.9 % (ref 24–44)
MCH RBC QN AUTO: 29.1 PG (ref 27–31)
MCHC RBC AUTO-ENTMCNC: 31.8 % (ref 32–36)
MCV RBC AUTO: 91.6 FL (ref 78–100)
MONOCYTES ABSOLUTE: 0.4 K/CU MM
MONOCYTES RELATIVE PERCENT: 6.7 % (ref 0–4)
MUCUS: ABNORMAL HPF
NITRITE URINE, QUANTITATIVE: NEGATIVE
NUCLEATED RBC %: 0 %
OPIATES, URINE: NEGATIVE
OXYCODONE: NEGATIVE
PDW BLD-RTO: 13 % (ref 11.7–14.9)
PH, URINE: 6 (ref 5–8)
PHENCYCLIDINE, URINE: NEGATIVE
PLATELET # BLD: 263 K/CU MM (ref 140–440)
PMV BLD AUTO: 9.8 FL (ref 7.5–11.1)
POTASSIUM SERPL-SCNC: 4 MMOL/L (ref 3.5–5.1)
PROTEIN UA: NEGATIVE MG/DL
RBC # BLD: 5.49 M/CU MM (ref 4.6–6.2)
RBC URINE: ABNORMAL /HPF (ref 0–3)
SALICYLATE LEVEL: <0.3 MG/DL (ref 15–30)
SEGMENTED NEUTROPHILS ABSOLUTE COUNT: 4 K/CU MM
SEGMENTED NEUTROPHILS RELATIVE PERCENT: 65.2 % (ref 36–66)
SODIUM BLD-SCNC: 141 MMOL/L (ref 135–145)
SPECIFIC GRAVITY UA: 1.03 (ref 1–1.03)
TOTAL IMMATURE NEUTOROPHIL: 0.01 K/CU MM
TOTAL NUCLEATED RBC: 0 K/CU MM
TOTAL PROTEIN: 7.8 GM/DL (ref 6.4–8.2)
TRICHOMONAS: ABNORMAL /HPF
UROBILINOGEN, URINE: NORMAL MG/DL (ref 0.2–1)
WBC # BLD: 6.1 K/CU MM (ref 4–10.5)
WBC UA: ABNORMAL /HPF (ref 0–2)

## 2020-10-07 PROCEDURE — G0480 DRUG TEST DEF 1-7 CLASSES: HCPCS

## 2020-10-07 PROCEDURE — 80053 COMPREHEN METABOLIC PANEL: CPT

## 2020-10-07 PROCEDURE — 85025 COMPLETE CBC W/AUTO DIFF WBC: CPT

## 2020-10-07 PROCEDURE — 99285 EMERGENCY DEPT VISIT HI MDM: CPT

## 2020-10-07 PROCEDURE — 6370000000 HC RX 637 (ALT 250 FOR IP): Performed by: PHYSICIAN ASSISTANT

## 2020-10-07 PROCEDURE — 36415 COLL VENOUS BLD VENIPUNCTURE: CPT

## 2020-10-07 PROCEDURE — 80307 DRUG TEST PRSMV CHEM ANLYZR: CPT

## 2020-10-07 PROCEDURE — 81001 URINALYSIS AUTO W/SCOPE: CPT

## 2020-10-07 RX ORDER — VENLAFAXINE HYDROCHLORIDE 150 MG/1
150 CAPSULE, EXTENDED RELEASE ORAL ONCE
Status: COMPLETED | OUTPATIENT
Start: 2020-10-07 | End: 2020-10-07

## 2020-10-07 RX ADMIN — VENLAFAXINE HYDROCHLORIDE 150 MG: 150 CAPSULE, EXTENDED RELEASE ORAL at 21:24

## 2020-10-07 NOTE — ED NOTES
Pt seems really agitated with mother, pt is blaming his mother for being here and has told the mother multiply times that she isn't helping anything and she keeps making everything worse.      Sophie Diaz  10/07/20 1744
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
## 2020-10-07 NOTE — ED PROVIDER NOTES
eMERGENCY dEPARTMENT eNCOUnter      PCP: Archana Bales MD    [facility]    Chief Complaint   Patient presents with    Suicidal     pink slipped by police, off medication x2 days, superfical cuts left with glass-covered with bandaid, no active bleeding        HPI    Porfirio Lopez is a 23 y.o. male who presents after being pink slipped by [facility], [facility] when he made comments of being increasingly depressed and wanting to hurt himself at his work. Patient states that he has been out of his Effexor for the past couple days and states that he has been increasingly depressed. He has cut himself on his left wrist, states that he has done this in the past and did this recently to help him focus. He endorses thoughts of hurting himself which is coming out of his mind for several years, however states that he would not actually act on these thoughts. He states that the pain of cutting himself helps him forget about other things going on his life so that he can focus. He does endorse history of depression and follows with a psychiatrist with [facility]. He has been on Effexor for several years and states that he recently ran out of medication 2 days ago and typically starts feeling worse when he is out of this medication. He denies any new life events or changes recently worsening his depression. He states he feels hopeless at times, again states that he has been feeling this way for several years and there has been no real acute changes with this. Denies alcohol or drug use. REVIEW OF SYSTEMS    Psychiatric: See HPI. No Auditory/visual hallucinations or suicidal/homicidal Ideations   General: No fevers or chills  Cardiac:  No chest pain or palpitations  Respiratory: No difficulty breathing or new cough  Neurologic: No Headache or syncope  GI: No vomiting or diarrhea  : No dysuria or hematuria  See HPI for further details.   All other systems reviewed and are negative. PAST MEDICAL & SURGICALHISTORY    Past Medical History:   Diagnosis Date    Anxiety     Asperger's disorder     Autistic disorder     Depression     Development disorder, child     Pervasive    OCD (obsessive compulsive disorder)     Urosepsis          Past Surgical History:   Procedure Laterality Date    APPENDECTOMY      APPENDECTOMY  13    COLONOSCOPY  13    normal    TYMPANOSTOMY TUBE PLACEMENT      as an infant       CURRENT MEDICATIONS    Current Outpatient Rx   Medication Sig Dispense Refill    hydrocortisone (ANUSOL-HC) 25 MG suppository Place 1 suppository rectally every 12 hours 14 suppository 0    busPIRone (BUSPAR) 10 MG tablet       venlafaxine (EFFEXOR XR) 150 MG XR capsule Take 150 mg by mouth daily.          ALLERGIES    No Known Allergies    SOCIAL & FAMILYHISTORY    Social History     Socioeconomic History    Marital status: Single     Spouse name: None    Number of children: None    Years of education: None    Highest education level: None   Occupational History    Occupation: Unemployed    Social Needs    Financial resource strain: None    Food insecurity     Worry: None     Inability: None    Transportation needs     Medical: None     Non-medical: None   Tobacco Use    Smoking status: Never Smoker    Smokeless tobacco: Never Used   Substance and Sexual Activity    Alcohol use: No     Alcohol/week: 0.0 standard drinks    Drug use: No    Sexual activity: Never   Lifestyle    Physical activity     Days per week: None     Minutes per session: None    Stress: None   Relationships    Social connections     Talks on phone: None     Gets together: None     Attends Druze service: None     Active member of club or organization: None     Attends meetings of clubs or organizations: None     Relationship status: None    Intimate partner violence     Fear of current or ex partner: None     Emotionally abused: None     Physically abused: None Forced sexual activity: None   Other Topics Concern    None   Social History Narrative    None     Family History   Problem Relation Age of Onset    Cancer Mother 25        cervical     High Cholesterol Mother     Kidney Disease Mother         stones    Diabetes Mother     High Blood Pressure Father     ADHD Brother     High Blood Pressure Maternal Grandmother     Heart Disease Maternal Grandmother     Diabetes Maternal Grandmother     High Blood Pressure Maternal Grandfather     High Blood Pressure Paternal Grandmother     Diabetes Paternal Grandmother     High Blood Pressure Paternal Grandfather        PHYSICAL EXAM    VITAL SIGNS: /66   Pulse 85   Temp 98.2 °F (36.8 °C) (Oral)   Resp 16   SpO2 99%   Constitutional:  Well developed, well nourished, no acute distress  Eyes:  EOMI. PERRL, conjunctiva normal   HENT:  Atraumatic, external ears normal, nose normal   Neck/Lymphatics: supple, no JVD, no swollen nodes. No thyromegaly or thyroid nodules. Respiratory:  No respiratory distress, normal breath sounds  Cardiovascular:  Normal rate, normal rhythm, no murmurs  GI:  Soft, nondistended, normal bowel sounds, nontender  Musculoskeletal:  No edema, no acute deformities   Integument:  Well hydrated, superficial cuts to left ventral forearm without active bleeding. No evidence of superimposed bacterial infection.   Neurologic:  Awake alert and oriented, no slurred speech, normal gross motor coordination and strength   Psychiatric: Normal thought pattern and insight      LABS:  Results for orders placed or performed during the hospital encounter of 10/07/20   CBC Auto Differential   Result Value Ref Range    WBC 6.1 4.0 - 10.5 K/CU MM    RBC 5.49 4.6 - 6.2 M/CU MM    Hemoglobin 16.0 13.5 - 18.0 GM/DL    Hematocrit 50.3 42 - 52 %    MCV 91.6 78 - 100 FL    MCH 29.1 27 - 31 PG    MCHC 31.8 (L) 32.0 - 36.0 %    RDW 13.0 11.7 - 14.9 %    Platelets 655 163 - 507 K/CU MM    MPV 9.8 7.5 - 11.1 FL Differential Type AUTOMATED DIFFERENTIAL     Segs Relative 65.2 36 - 66 %    Lymphocytes % 27.9 24 - 44 %    Monocytes % 6.7 (H) 0 - 4 %    Eosinophils % 0.0 0 - 3 %    Basophils % 0.0 0 - 1 %    Segs Absolute 4.0 K/CU MM    Lymphocytes Absolute 1.7 K/CU MM    Monocytes Absolute 0.4 K/CU MM    Eosinophils Absolute 0.0 K/CU MM    Basophils Absolute 0.0 K/CU MM    Nucleated RBC % 0.0 %    Total Nucleated RBC 0.0 K/CU MM    Total Immature Neutrophil 0.01 K/CU MM    Immature Neutrophil % 0.2 0 - 0.43 %   Comprehensive Metabolic Panel   Result Value Ref Range    Sodium 141 135 - 145 MMOL/L    Potassium 4.0 3.5 - 5.1 MMOL/L    Chloride 100 99 - 110 mMol/L    CO2 31 21 - 32 MMOL/L    BUN 13 6 - 23 MG/DL    CREATININE 0.8 (L) 0.9 - 1.3 MG/DL    Glucose 87 70 - 99 MG/DL    Calcium 10.0 8.3 - 10.6 MG/DL    Alb 5.1 (H) 3.4 - 5.0 GM/DL    Total Protein 7.8 6.4 - 8.2 GM/DL    Total Bilirubin 0.3 0.0 - 1.0 MG/DL    ALT 20 10 - 40 U/L    AST 20 15 - 37 IU/L    Alkaline Phosphatase 101 40 - 129 IU/L    GFR Non-African American >60 >60 mL/min/1.73m2    GFR African American >60 >60 mL/min/1.73m2    Anion Gap 10 4 - 16   Urinalysis   Result Value Ref Range    Color, UA YELLOW YELLOW    Clarity, UA CLEAR CLEAR    Glucose, Urine NEGATIVE NEGATIVE MG/DL    Bilirubin Urine NEGATIVE NEGATIVE MG/DL    Ketones, Urine NEGATIVE NEGATIVE MG/DL    Specific Gravity, UA 1.027 1.001 - 1.035    Blood, Urine NEGATIVE NEGATIVE    pH, Urine 6.0 5.0 - 8.0    Protein, UA NEGATIVE NEGATIVE MG/DL    Urobilinogen, Urine NORMAL 0.2 - 1.0 MG/DL    Nitrite Urine, Quantitative NEGATIVE NEGATIVE    Leukocyte Esterase, Urine NEGATIVE NEGATIVE    RBC, UA NONE SEEN 0 - 3 /HPF    WBC, UA NONE SEEN 0 - 2 /HPF    Bacteria, UA NEGATIVE NEGATIVE /HPF    Mucus, UA MODERATE (A) NEGATIVE HPF    Trichomonas, UA NONE SEEN NONE SEEN /HPF    Hyaline Casts, UA 2 /LPF   Urine Drug Screen   Result Value Ref Range    Cannabinoid Scrn, Ur NEGATIVE NEGATIVE    Amphetamines NEGATIVE NEGATIVE    Cocaine Metabolite NEGATIVE NEGATIVE    Benzodiazepine Screen, Urine NEGATIVE NEGATIVE    Barbiturate Screen, Ur NEGATIVE NEGATIVE    Opiates, Urine NEGATIVE NEGATIVE    Phencyclidine, Urine NEGATIVE NEGATIVE    Oxycodone NEGATIVE NEGATIVE   Ethanol   Result Value Ref Range    Alcohol Scrn <0.01 <2.61 %WT/VOL   Salicylate   Result Value Ref Range    Salicylate Lvl <4.1 (L) 15 - 30 MG/DL    DOSE AMOUNT DOSE AMT. GIVEN - Unknown     DOSE TIME DOSE TIME GIVEN - Unknown    Acetaminophen Level   Result Value Ref Range    Acetaminophen Level <5.0 (L) 15 - 30 ug/ml    DOSE AMOUNT DOSE AMT. GIVEN - UNKNOWN     DOSE TIME DOSE TIME GIVEN - UNKNOWN              ED COURSE & MEDICAL DECISION MAKING    Vitals:    10/07/20 1741   BP: 132/66   Pulse: 85   Resp: 16   Temp: 98.2 °F (36.8 °C)   TempSrc: Oral   SpO2: 99%       ED COURSE & MEDICAL DECISION MAKING       Vital signs and nursing notes reviewed during ED course. Patient care and presentation staffed with supervising MD.  Patient seen by supervising MD today- see his/her note for details of the encounter. Patient presents as above after coworkers were concerned for increased depression and comments of hurting himself. He is hemodynamically stable on arrival, afebrile. He states that he has had thoughts of suicidality for several years, however denies wanting to act on these thoughts. He has cut himself in the past, does have superficial cuts to his wrist on exam today without active bleeding, states that he does this to help him focus without the intention of killing himself. He has been following with psychiatrist, Dawson Burroughs and states that he has continued to follow with her recently, doing phone calls with ongoing COVID-19 pandemic. He has been out of Effexor the past couple days. He is given dose of this today. Lab work without evidence of emergent process.  There are no medical problems identified which require immediate intervention or which would harm.        Clinical  IMPRESSION    1. Depression, unspecified depression type    2. Suicidal thoughts        Comment: Please note this report has been produced using speech recognition software and may contain errors related to that system including errors in grammar, punctuation, and spelling, as well as words and phrases that may be inappropriate. If there are any questions or concerns please feel free to contact the dictating provider for clarification.              UNIQUE Allen  10/07/20 9626
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
## 2020-10-07 NOTE — ED NOTES
Bed: H-01  Expected date:   Expected time:   Means of arrival:   Comments:  [address] RN  10/07/20 7675

## 2020-10-08 NOTE — ED NOTES
Patient resting comfortably in bed. Denies needing anything at this time. Sitter is at bedside. Will continue to monitor.       Odalys Dc RN  10/07/20 8080
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
## 2020-10-08 NOTE — ED NOTES
The patient is highly intelligent and is very calm when speaking.       Diann Hanson RN  10/07/20 2121

## 2020-10-08 NOTE — ED NOTES
This nurse had a one on one conversation about his mental state and he states he is not currently having SI thoughts. Patient states he has never had thoughts of harming anyone else. This nurse asked the patient if he felt 100% safe going and not trying to harm himself. The patient stated yes, he feels 100% safe of going home.       Herlinda Alfaro RN  10/07/20 2073

## 2020-10-08 NOTE — ED NOTES
This nurse spoke to [facility] Ambassador Emily Hinojosa about him putting this patient in for discharge and his statement back to me was Mariam Padron is he going to get the gasoline to follow through. \" Informed [facility] Ambassador Emily Hinojosa about my concerns discharging him.       Wilma Robertson RN  10/07/20 204

## 2020-10-08 NOTE — ED NOTES
Patient resting comfortably in bed. Denies needing anything at this time. Sitter is at bedside. Will continue to monitor.       Riana Rodriguez RN  10/07/20 9785

## 2020-10-08 NOTE — ED NOTES
Patient resting comfortably in bed. Denies needing anything at this time. Sitter is at bedside. Will continue to monitor.       Ana Nicholson RN  10/07/20 7393

## 2022-06-14 ENCOUNTER — HOSPITAL ENCOUNTER (OUTPATIENT)
Age: 27
Discharge: HOME | End: 2022-06-14
Payer: MEDICARE

## 2022-06-14 LAB
ALBUMIN SERPL-MCNC: 4.8 GM/DL (ref 3.4–5)
ALP BLD-CCNC: 105 IU/L (ref 40–128)
ALT SERPL-CCNC: 28 U/L (ref 10–40)
ANION GAP SERPL CALCULATED.3IONS-SCNC: 13 MMOL/L (ref 4–16)
AST SERPL-CCNC: 19 IU/L (ref 15–37)
BILIRUB SERPL-MCNC: 0.6 MG/DL (ref 0–1)
BUN BLDV-MCNC: 16 MG/DL (ref 6–23)
CALCIUM SERPL-MCNC: 9.1 MG/DL (ref 8.3–10.6)
CHLORIDE BLD-SCNC: 102 MMOL/L (ref 99–110)
CHOLESTEROL: 169 MG/DL
CO2: 24 MMOL/L (ref 21–32)
CREAT SERPL-MCNC: 0.7 MG/DL (ref 0.9–1.3)
GFR AFRICAN AMERICAN: >60 ML/MIN/1.73M2
GFR NON-AFRICAN AMERICAN: >60 ML/MIN/1.73M2
GLUCOSE BLD-MCNC: 83 MG/DL (ref 70–99)
HDLC SERPL-MCNC: 41 MG/DL
LDL CHOLESTEROL CALCULATED: 112 MG/DL
POTASSIUM SERPL-SCNC: 4 MMOL/L (ref 3.5–5.1)
SODIUM BLD-SCNC: 139 MMOL/L (ref 135–145)
TOTAL PROTEIN: 7.2 GM/DL (ref 6.4–8.2)
TRIGL SERPL-MCNC: 79 MG/DL

## 2022-06-14 PROCEDURE — 80053 COMPREHEN METABOLIC PANEL: CPT

## 2022-06-14 PROCEDURE — 36415 COLL VENOUS BLD VENIPUNCTURE: CPT

## 2022-06-14 PROCEDURE — 80061 LIPID PANEL: CPT

## 2023-01-14 ENCOUNTER — HOSPITAL ENCOUNTER (OUTPATIENT)
Age: 28
Discharge: HOME | End: 2023-01-14
Payer: MEDICARE

## 2023-01-14 LAB
ALBUMIN SERPL-MCNC: 4.8 GM/DL (ref 3.4–5)
ALP BLD-CCNC: 88 IU/L (ref 40–128)
ALT SERPL-CCNC: 25 U/L (ref 10–40)
ANION GAP SERPL CALCULATED.3IONS-SCNC: 12 MMOL/L (ref 4–16)
AST SERPL-CCNC: 19 IU/L (ref 15–37)
BASOPHILS ABSOLUTE: 0 K/CU MM
BASOPHILS RELATIVE PERCENT: 0.2 % (ref 0–1)
BILIRUB SERPL-MCNC: 0.3 MG/DL (ref 0–1)
BUN BLDV-MCNC: 16 MG/DL (ref 6–23)
CALCIUM SERPL-MCNC: 9.4 MG/DL (ref 8.3–10.6)
CHLORIDE BLD-SCNC: 103 MMOL/L (ref 99–110)
CHOLESTEROL: 196 MG/DL
CO2: 26 MMOL/L (ref 21–32)
CREAT SERPL-MCNC: 0.8 MG/DL (ref 0.9–1.3)
DIFFERENTIAL TYPE: ABNORMAL
EOSINOPHILS ABSOLUTE: 0 K/CU MM
EOSINOPHILS RELATIVE PERCENT: 0 % (ref 0–3)
GFR SERPL CREATININE-BSD FRML MDRD: >60 ML/MIN/1.73M2
GLUCOSE BLD-MCNC: 80 MG/DL (ref 70–99)
HCT VFR BLD CALC: 43.6 % (ref 42–52)
HDLC SERPL-MCNC: 47 MG/DL
HEMOGLOBIN: 14.4 GM/DL (ref 13.5–18)
IMMATURE NEUTROPHIL %: 0.4 % (ref 0–0.43)
LDL CHOLESTEROL CALCULATED: 137 MG/DL
LYMPHOCYTES ABSOLUTE: 1.6 K/CU MM
LYMPHOCYTES RELATIVE PERCENT: 30.4 % (ref 24–44)
MCH RBC QN AUTO: 28.6 PG (ref 27–31)
MCHC RBC AUTO-ENTMCNC: 33 % (ref 32–36)
MCV RBC AUTO: 86.5 FL (ref 78–100)
MONOCYTES ABSOLUTE: 0.4 K/CU MM
MONOCYTES RELATIVE PERCENT: 6.7 % (ref 0–4)
NUCLEATED RBC %: 0 %
PDW BLD-RTO: 12.9 % (ref 11.7–14.9)
PLATELET # BLD: 279 K/CU MM (ref 140–440)
PMV BLD AUTO: 9.6 FL (ref 7.5–11.1)
POTASSIUM SERPL-SCNC: 3.8 MMOL/L (ref 3.5–5.1)
RBC # BLD: 5.04 M/CU MM (ref 4.6–6.2)
SEGMENTED NEUTROPHILS ABSOLUTE COUNT: 3.3 K/CU MM
SEGMENTED NEUTROPHILS RELATIVE PERCENT: 62.3 % (ref 36–66)
SODIUM BLD-SCNC: 141 MMOL/L (ref 135–145)
TOTAL IMMATURE NEUTOROPHIL: 0.02 K/CU MM
TOTAL NUCLEATED RBC: 0 K/CU MM
TOTAL PROTEIN: 6.9 GM/DL (ref 6.4–8.2)
TRIGL SERPL-MCNC: 62 MG/DL
TSH HIGH SENSITIVITY: 1.64 UIU/ML (ref 0.27–4.2)
VITAMIN D 25-HYDROXY: 23.01 NG/ML
WBC # BLD: 5.3 K/CU MM (ref 4–10.5)

## 2023-01-14 PROCEDURE — 36415 COLL VENOUS BLD VENIPUNCTURE: CPT

## 2023-01-14 PROCEDURE — 85025 COMPLETE CBC W/AUTO DIFF WBC: CPT

## 2023-01-14 PROCEDURE — 84443 ASSAY THYROID STIM HORMONE: CPT

## 2023-01-14 PROCEDURE — 80061 LIPID PANEL: CPT

## 2023-01-14 PROCEDURE — 80053 COMPREHEN METABOLIC PANEL: CPT

## 2023-01-14 PROCEDURE — 82306 VITAMIN D 25 HYDROXY: CPT

## 2023-11-01 NOTE — PROGRESS NOTES
Chente Grigsby   23 y.o.  male  [id number]      Chief Complaint   Patient presents with    Follow-up    Depression    Anxiety    Results        Subjective:  22 y.o.male is here for a follow up. He has the following chronic/acute medical problems:    OCD (obsessive compulsive disorder)  Following with psychiatry.  Asperger's disorder    Major depressive disorder, recurrent episode with anxious distress ([address]  Mood is stable on the current medications.  Generalized anxiety disorder    Gastroesophageal reflux disease  Stable on PPI.  Mixed hyperlipidemia  Not currently on any lipid-lowering agent. Trying to manage with diet and lifestyle modifications.  Overweight (BMI 25.0-29. 9)  No recent significant weight gain or weight loss. Switched Psychiatrist x3 over the last 6 months. Follows at the [facility]. \"Hectic\" with his mother working and having to take take care of his niece. Review of Systems   Constitutional: Negative for chills and fever. HENT: Negative for congestion and sore throat. Eyes: Negative for pain and visual disturbance. Respiratory: Negative for cough, shortness of breath and wheezing. Cardiovascular: Negative for chest pain, palpitations and leg swelling. Gastrointestinal: Negative for abdominal pain, constipation and diarrhea. Genitourinary: Negative for dysuria and hematuria. Musculoskeletal: Negative for back pain and neck pain. Skin: Negative for rash. Neurological: Negative for dizziness, weakness, numbness and headaches. Psychiatric/Behavioral: Negative for sleep disturbance. The patient is not nervous/anxious.         Current Outpatient Prescriptions   Medication Sig Dispense Refill    pantoprazole (PROTONIX) 40 MG tablet Take 1 tablet by mouth daily 30 tablet 3    busPIRone (BUSPAR) 10 MG tablet       cetirizine (ZYRTEC) 10 MG tablet Take 1 tablet by mouth daily 30 tablet 1    venlafaxine (EFFEXOR XR) TRIG 69 06/27/2017    TRIG 99 06/13/2016    TRIG 84 06/18/2013     Lab Results   Component Value Date    HDL 42 06/27/2017    HDL 29 (L) 06/13/2016    HDL 43 (L) 06/18/2013     Lab Results   Component Value Date    LDLCALC 115 (H) 06/27/2017    LDLCALC 137 (H) 06/13/2016    LDLCALC 139 (H) 06/18/2013     Lab Results   Component Value Date    LABA1C 5.1 10/06/2015     Lab Results   Component Value Date    TSHHS 2.040 05/04/2015         ASSESSMENT:      1. Major depressive disorder, recurrent episode with anxious distress ([facility] [address]    2. Asperger's disorder    3. Autism spectrum disorder    4. Gastroesophageal reflux disease, esophagitis presence not specified    5. Overweight (BMI 25.0-29.9)    6. Transaminitis    7. Lipid screening    8. Need for influenza vaccination        PLAN:  1. Follow-up with psychiatry as scheduled. 2.  Medications reviewed and reconciled. Necessary refills provided. 3.  See orders. Orders Placed This Encounter   Procedures    INFLUENZA, QUADV, 3 YRS AND OLDER, IM, PF, PREFILL SYR OR SDV, 0.5ML (FLUZONE QUADV, PF)    Comprehensive Metabolic Panel    Lipid Panel       Care discussed with patient. Questions answered. Patient verbalizes understanding and agrees with plan. After visit summary provided. Advised to call for any problems, questions, or concerns. Return in about 6 months (around 6/8/2018) for Lab f/u, depression. This note was partially completed with a verbal recognition program and it was checked for errors. It is possible that there are still dictated errors within this office note. Any errors should be brought immediately to my attention for correction. All efforts were made to ensure that this office note is accurate.        Signed:  Chel Rhodes MD  12/31/17  2:00 PM Yes

## 2024-07-22 ENCOUNTER — HOSPITAL ENCOUNTER (OUTPATIENT)
Age: 29
Discharge: HOME | End: 2024-07-22
Payer: MEDICARE

## 2024-07-22 LAB
25(OH)D3 SERPL-MCNC: 29.35 NG/ML
ALBUMIN SERPL-MCNC: 4.8 GM/DL (ref 3.4–5)
ALP BLD-CCNC: 102 IU/L (ref 40–129)
ALT SERPL-CCNC: 88 U/L (ref 10–40)
ANION GAP SERPL CALCULATED.3IONS-SCNC: 14 MMOL/L (ref 7–16)
AST SERPL-CCNC: 37 IU/L (ref 15–37)
BASOPHILS ABSOLUTE: 0 K/CU MM
BASOPHILS RELATIVE PERCENT: 0.2 % (ref 0–1)
BILIRUB SERPL-MCNC: 0.6 MG/DL (ref 0–1)
BUN SERPL-MCNC: 12 MG/DL (ref 6–23)
CALCIUM SERPL-MCNC: 10 MG/DL (ref 8.3–10.6)
CHLORIDE BLD-SCNC: 102 MMOL/L (ref 99–110)
CO2: 24 MMOL/L (ref 21–32)
CREAT SERPL-MCNC: 0.8 MG/DL (ref 0.9–1.3)
DIFFERENTIAL TYPE: ABNORMAL
EOSINOPHILS ABSOLUTE: 0 K/CU MM
EOSINOPHILS RELATIVE PERCENT: 0 % (ref 0–3)
GFR, ESTIMATED: >90 ML/MIN/1.73M2
GLUCOSE SERPL-MCNC: 89 MG/DL (ref 70–99)
HCT VFR BLD CALC: 47.5 % (ref 42–52)
HEMOGLOBIN: 15.6 GM/DL (ref 13.5–18)
IMMATURE NEUTROPHIL %: 0.3 % (ref 0–0.43)
LYMPHOCYTES ABSOLUTE: 1.8 K/CU MM
LYMPHOCYTES RELATIVE PERCENT: 27.6 % (ref 24–44)
MCH RBC QN AUTO: 28.2 PG (ref 27–31)
MCHC RBC AUTO-ENTMCNC: 32.8 % (ref 32–36)
MCV RBC AUTO: 85.9 FL (ref 78–100)
MONOCYTES ABSOLUTE: 0.5 K/CU MM
MONOCYTES RELATIVE PERCENT: 7.3 % (ref 0–4)
NEUTROPHILS ABSOLUTE: 4.3 K/CU MM
NEUTROPHILS RELATIVE PERCENT: 64.6 % (ref 36–66)
NUCLEATED RBC %: 0 %
PDW BLD-RTO: 13.2 % (ref 11.7–14.9)
PLATELET # BLD: 273 K/CU MM (ref 140–440)
PMV BLD AUTO: 10 FL (ref 7.5–11.1)
POTASSIUM SERPL-SCNC: 3.9 MMOL/L (ref 3.5–5.1)
RBC # BLD: 5.53 M/CU MM (ref 4.6–6.2)
SODIUM BLD-SCNC: 140 MMOL/L (ref 135–145)
TOTAL IMMATURE NEUTOROPHIL: 0.02 K/CU MM
TOTAL NUCLEATED RBC: 0 K/CU MM
TOTAL PROTEIN: 7.7 GM/DL (ref 6.4–8.2)
TSH SERPL DL<=0.005 MIU/L-ACNC: 1.92 UIU/ML (ref 0.27–4.2)
WBC # BLD: 6.6 K/CU MM (ref 4–10.5)

## 2024-07-22 PROCEDURE — 36415 COLL VENOUS BLD VENIPUNCTURE: CPT

## 2024-07-22 PROCEDURE — 82306 VITAMIN D 25 HYDROXY: CPT

## 2024-07-22 PROCEDURE — 86803 HEPATITIS C AB TEST: CPT

## 2024-07-22 PROCEDURE — 84443 ASSAY THYROID STIM HORMONE: CPT

## 2024-07-22 PROCEDURE — 85025 COMPLETE CBC W/AUTO DIFF WBC: CPT

## 2024-07-22 PROCEDURE — 80061 LIPID PANEL: CPT

## 2024-07-22 PROCEDURE — 80053 COMPREHEN METABOLIC PANEL: CPT

## 2024-07-23 LAB
ALBUMIN SERPL-MCNC: 5 GM/DL (ref 3.4–5)
ALP BLD-CCNC: 100 IU/L (ref 40–128)
ALT SERPL-CCNC: 86 U/L (ref 10–40)
ANION GAP SERPL CALCULATED.3IONS-SCNC: 22 MMOL/L (ref 7–16)
AST SERPL-CCNC: 40 IU/L (ref 15–37)
BILIRUB SERPL-MCNC: 0.4 MG/DL (ref 0–1)
BUN SERPL-MCNC: 13 MG/DL (ref 6–23)
CALCIUM SERPL-MCNC: 9.8 MG/DL (ref 8.3–10.6)
CHLORIDE BLD-SCNC: 100 MMOL/L (ref 99–110)
CHOLESTEROL, FASTING: 239 MG/DL
CHOLESTEROL, FASTING: 240 MG/DL
CO2: 19 MMOL/L (ref 21–32)
CREAT SERPL-MCNC: 0.8 MG/DL (ref 0.9–1.3)
GFR, ESTIMATED: >90 ML/MIN/1.73M2
GLUCOSE SERPL-MCNC: 85 MG/DL (ref 70–99)
HCV AB SERPL QL IA: NON REACTIVE
HDLC SERPL-MCNC: 40 MG/DL
HDLC SERPL-MCNC: 41 MG/DL
LDLC SERPL CALC-MCNC: 178 MG/DL
LDLC SERPL CALC-MCNC: 180 MG/DL
POTASSIUM SERPL-SCNC: 4.1 MMOL/L (ref 3.5–5.1)
SODIUM BLD-SCNC: 141 MMOL/L (ref 135–145)
TOTAL PROTEIN: 8.6 GM/DL (ref 6.4–8.2)
TRIGLYCERIDE, FASTING: 101 MG/DL
TRIGLYCERIDE, FASTING: 102 MG/DL
